# Patient Record
Sex: MALE | Race: WHITE | NOT HISPANIC OR LATINO | Employment: OTHER | URBAN - METROPOLITAN AREA
[De-identification: names, ages, dates, MRNs, and addresses within clinical notes are randomized per-mention and may not be internally consistent; named-entity substitution may affect disease eponyms.]

---

## 2020-03-17 ENCOUNTER — OFFICE VISIT (OUTPATIENT)
Dept: FAMILY MEDICINE | Facility: CLINIC | Age: 70
End: 2020-03-17
Payer: MEDICARE

## 2020-03-17 VITALS
SYSTOLIC BLOOD PRESSURE: 126 MMHG | WEIGHT: 214 LBS | OXYGEN SATURATION: 98 % | HEART RATE: 68 BPM | BODY MASS INDEX: 32.43 KG/M2 | TEMPERATURE: 98 F | HEIGHT: 68 IN | RESPIRATION RATE: 18 BRPM | DIASTOLIC BLOOD PRESSURE: 76 MMHG

## 2020-03-17 DIAGNOSIS — Z12.11 SCREENING FOR MALIGNANT NEOPLASM OF COLON: ICD-10-CM

## 2020-03-17 DIAGNOSIS — Z00.00 WELLNESS EXAMINATION: Primary | ICD-10-CM

## 2020-03-17 DIAGNOSIS — Z11.59 NEED FOR HEPATITIS C SCREENING TEST: ICD-10-CM

## 2020-03-17 DIAGNOSIS — Z79.899 ON LONG TERM DRUG THERAPY: ICD-10-CM

## 2020-03-17 DIAGNOSIS — Z12.5 SCREENING FOR MALIGNANT NEOPLASM OF PROSTATE: ICD-10-CM

## 2020-03-17 PROCEDURE — 99387 PR PREVENTIVE VISIT,NEW,65 & OVER: ICD-10-PCS | Mod: S$GLB,,, | Performed by: FAMILY MEDICINE

## 2020-03-17 PROCEDURE — 99387 INIT PM E/M NEW PAT 65+ YRS: CPT | Mod: S$GLB,,, | Performed by: FAMILY MEDICINE

## 2020-03-17 NOTE — PROGRESS NOTES
Subjective:      Patient ID: Torrey Lira is a 69 y.o. male.    Chief Complaint: Establish Care (has not had a PCP since he was 56/doesnt like taking medication/wife passed in august )      HPI:  69-year-old white male past history of knee osteoarthritis who presents for wellness exam.  Does not see a doctor regularly.  Was last seen went at age 56 for sinus congestion.  He does not use tobacco currently.  He quit smoking 30 years ago.  Drinks ethanol only on occasion and not in excess.  Complains of some left-sided knee pain.  Had a right-sided knee replacement in the feels great.  Has a follow-up with orthopedics coming up.  His wife  a few months ago.  He has some status around this.  He has a history of erectile dysfunction.  Did not do well with Viagra but did well with Cialis.    History reviewed. No pertinent past medical history.  Past Surgical History:   Procedure Laterality Date    LASIK Bilateral     TOTAL KNEE REPLACEMENT USING COMPUTER NAVIGATION Bilateral 2019, 2019     History reviewed. No pertinent family history.  Social History     Socioeconomic History    Marital status:      Spouse name: Not on file    Number of children: Not on file    Years of education: Not on file    Highest education level: Not on file   Occupational History    Not on file   Social Needs    Financial resource strain: Not on file    Food insecurity:     Worry: Not on file     Inability: Not on file    Transportation needs:     Medical: Not on file     Non-medical: Not on file   Tobacco Use    Smoking status: Former Smoker    Smokeless tobacco: Never Used   Substance and Sexual Activity    Alcohol use: Yes     Comment: only a shot when hes got a cold     Drug use: Never    Sexual activity: Not on file   Lifestyle    Physical activity:     Days per week: Not on file     Minutes per session: Not on file    Stress: Not on file   Relationships    Social connections:     Talks on phone: Not  "on file     Gets together: Not on file     Attends [employer] service: Not on file     Active member of club or organization: Not on file     Attends meetings of clubs or organizations: Not on file     Relationship status: Not on file   Other Topics Concern    Not on file   Social History Narrative    Not on file     Review of patient's allergies indicates:  No Known Allergies    Review of Systems   Constitutional: Negative for activity change, appetite change, chills, fatigue, fever and unexpected weight change.   HENT: Negative for congestion, ear pain, rhinorrhea, sinus pressure, sinus pain, sneezing, sore throat and trouble swallowing.    Eyes: Negative for photophobia, pain and itching.   Respiratory: Negative for cough, chest tightness, shortness of breath and wheezing.    Cardiovascular: Negative for chest pain, palpitations and leg swelling.   Gastrointestinal: Negative for abdominal distention, abdominal pain, constipation, diarrhea, nausea and vomiting.   Endocrine: Negative for cold intolerance, heat intolerance, polydipsia and polyphagia.   Genitourinary: Negative for difficulty urinating, dysuria and frequency.   Musculoskeletal: Positive for arthralgias and gait problem. Negative for joint swelling and myalgias.   Skin: Negative for pallor and rash.   Neurological: Negative for dizziness, seizures, syncope, speech difficulty and headaches.   Hematological: Negative for adenopathy. Does not bruise/bleed easily.   Psychiatric/Behavioral: Negative for agitation, behavioral problems and hallucinations.       Objective:       /76 (BP Location: Left arm, Patient Position: Sitting, BP Method: Large (Manual))   Pulse 68   Temp 98.3 °F (36.8 °C) (Oral)   Resp 18   Ht 5' 8" (1.727 m)   Wt 97.1 kg (214 lb)   SpO2 98%   BMI 32.54 kg/m²   Physical Exam   Constitutional: He is oriented to person, place, and time. He appears well-developed and well-nourished.   HENT:   Head: Normocephalic and " atraumatic.   Nose: Nose normal.   Mouth/Throat: Oropharynx is clear and moist.   Eyes: Pupils are equal, round, and reactive to light. Conjunctivae and EOM are normal.   Neck: Normal range of motion. Neck supple.   Cardiovascular: Normal rate, regular rhythm, normal heart sounds and intact distal pulses.   Pulmonary/Chest: Effort normal and breath sounds normal.   Abdominal: Soft. There is no tenderness.   Musculoskeletal: Normal range of motion.   Swelling noted to bilateral knees, surgical scars noted over bilateral knees.  Walking with slight limp on left.   Neurological: He is alert and oriented to person, place, and time.   Skin: Skin is warm and dry.   Psychiatric: He has a normal mood and affect. His behavior is normal. Judgment and thought content normal.   Nursing note and vitals reviewed.      Assessment:     1. Wellness examination    2. Screening for malignant neoplasm of colon    3. Screening for malignant neoplasm of prostate    4. On long term drug therapy    5. Need for hepatitis C screening test        Plan:   Wellness examination  -     CBC auto differential; Future; Expected date: 03/17/2020  -     TSH; Future; Expected date: 03/17/2020  -     T4, free; Future; Expected date: 03/17/2020  -     Comprehensive metabolic panel; Future; Expected date: 03/17/2020  -     Lipid panel; Future; Expected date: 03/17/2020  -     PSA, Screening; Future; Expected date: 03/17/2020  -     Fecal Immunochemical Test (iFOBT); Future; Expected date: 03/17/2020  -     Fecal Immunochemical Test (iFOBT); Future; Expected date: 03/17/2020    Screening for malignant neoplasm of colon  -     Fecal Immunochemical Test (iFOBT); Future; Expected date: 03/17/2020  -     Fecal Immunochemical Test (iFOBT); Future; Expected date: 03/17/2020    Screening for malignant neoplasm of prostate  -     PSA, Screening; Future; Expected date: 03/17/2020    On long term drug therapy  -     CBC auto differential; Future; Expected date:  03/17/2020  -     TSH; Future; Expected date: 03/17/2020  -     T4, free; Future; Expected date: 03/17/2020  -     Comprehensive metabolic panel; Future; Expected date: 03/17/2020  -     Lipid panel; Future; Expected date: 03/17/2020    Need for hepatitis C screening test  -     Hepatitis Panel, Acute; Future; Expected date: 03/17/2020      Reports he received flu and pneumonia vaccine at the pharmacy this year.    Consider medication based on lab findings.    Declined colonoscopy.  He is interested fit testing.         Disclaimer: This note may have been prepared using voice recognition software, it may have not been extensively proofed, as such there could be errors within the text such as sound alike errors.

## 2020-03-18 LAB
ABS NRBC COUNT: 0 X 10 3/UL (ref 0–0.01)
ABSOLUTE BASOPHIL: 0.03 X 10 3/UL (ref 0–0.22)
ABSOLUTE EOSINOPHIL: 0.1 X 10 3/UL (ref 0.04–0.54)
ABSOLUTE IMMATURE GRAN: 0.02 X 10 3/UL (ref 0–0.04)
ABSOLUTE LYMPHOCYTE: 3.06 X 10 3/UL (ref 0.86–4.75)
ABSOLUTE MONOCYTE: 0.64 X 10 3/UL (ref 0.22–1.08)
ALBUMIN SERPL-MCNC: 4.5 G/DL (ref 3.5–5.2)
ALBUMIN/GLOB SERPL ELPH: 1.5 {RATIO} (ref 1–2.7)
ALP ISOS SERPL LEV INH-CCNC: 70 U/L (ref 40–130)
ALT (SGPT): 39 U/L (ref 0–41)
ANION GAP SERPL CALC-SCNC: 14 MMOL/L (ref 8–17)
AST SERPL-CCNC: 35 U/L (ref 0–40)
BASOPHILS NFR BLD: 0.4 % (ref 0.2–1.2)
BILIRUBIN, TOTAL: 0.45 MG/DL (ref 0–1.2)
BUN/CREAT SERPL: 14.1 (ref 6–20)
CALCIUM SERPL-MCNC: 9.9 MG/DL (ref 8.6–10.2)
CARBON DIOXIDE, CO2: 23 MMOL/L (ref 22–29)
CHLORIDE: 104 MMOL/L (ref 98–107)
CHOLEST SERPL-MSCNC: 205 MG/DL (ref 100–200)
CREAT SERPL-MCNC: 0.8 MG/DL (ref 0.7–1.2)
EOSINOPHIL NFR BLD: 1.4 % (ref 0.7–7)
GFR ESTIMATION: 96.13
GLOBULIN: 3 G/DL (ref 1.5–4.5)
GLUCOSE: 89 MG/DL (ref 82–115)
HBV CORE IGM SERPL QL IA: NONREACTIVE
HBV SURFACE AG SERPL QL IA: NONREACTIVE
HCT VFR BLD AUTO: 48.8 % (ref 42–52)
HCV IGG SERPL QL IA: NONREACTIVE
HDLC SERPL-MCNC: 36 MG/DL
HEPATITIS A IGM: NONREACTIVE
HGB BLD-MCNC: 16.1 G/DL (ref 14–18)
IMMATURE GRANULOCYTES: 0.3 % (ref 0–0.5)
LDL/HDL RATIO: 3.5 (ref 1–3)
LDLC SERPL CALC-MCNC: 125.4 MG/DL (ref 0–100)
LYMPHOCYTES NFR BLD: 41.6 % (ref 19.3–53.1)
MCH RBC QN AUTO: 28.9 PG (ref 27–32)
MCHC RBC AUTO-ENTMCNC: 33 G/DL (ref 32–36)
MCV RBC AUTO: 87.5 FL (ref 80–94)
MONOCYTES NFR BLD: 8.7 % (ref 4.7–12.5)
NEUTROPHILS ABSOLUTE COUNT: 3.51 X 10 3/UL (ref 2.15–7.56)
NEUTROPHILS NFR BLD: 47.6 % (ref 34–71.1)
NUCLEATED RED BLOOD CELLS: 0 /100 WBC (ref 0–0.2)
PLATELET # BLD AUTO: 237 X 10 3/UL (ref 135–400)
POTASSIUM: 4.8 MMOL/L (ref 3.5–5.1)
PROT SNV-MCNC: 7.5 G/DL (ref 6.4–8.3)
PSA, DIAGNOSTIC: 0.53 NG/ML (ref 0–4)
RBC # BLD AUTO: 5.58 X 10 6/UL (ref 4.7–6.1)
RDW-SD: 39.7 FL (ref 37–54)
SODIUM: 141 MMOL/L (ref 136–145)
T4, FREE: 0.91 NG/DL (ref 0.93–1.7)
TRIGL SERPL-MCNC: 218 MG/DL (ref 0–150)
TSH SERPL DL<=0.005 MIU/L-ACNC: 3.14 UIU/ML (ref 0.27–4.2)
UREA NITROGEN (BUN): 11.3 MG/DL (ref 8–23)
WBC # BLD: 7.36 X 10 3/UL (ref 4.3–10.8)

## 2020-06-02 ENCOUNTER — TELEPHONE (OUTPATIENT)
Dept: FAMILY MEDICINE | Facility: CLINIC | Age: 70
End: 2020-06-02

## 2020-06-02 DIAGNOSIS — N52.9 ERECTILE DYSFUNCTION, UNSPECIFIED ERECTILE DYSFUNCTION TYPE: Primary | ICD-10-CM

## 2020-06-02 RX ORDER — TADALAFIL 10 MG/1
10 TABLET ORAL DAILY PRN
Qty: 6 TABLET | Refills: 1 | Status: SHIPPED | OUTPATIENT
Start: 2020-06-02 | End: 2020-09-08 | Stop reason: SDUPTHER

## 2020-06-02 NOTE — TELEPHONE ENCOUNTER
----- Message from Nicolette Yarbrough PA-C sent at 6/2/2020  9:06 AM CDT -----  Contact: patient  Prescription sent for cialis. 6 tablets sent with 1 refill.  Tried to contact pt. No answer. Please inform pt to take 1 tablet as needed. Warn of side effects; hypotension and priapism. [facility] may have coupon to get it cheaper.   ----- Message -----  From: Linda Weiner LPN  Sent: 6/1/2020   5:12 PM CDT  To: Nicolette Yarbrough PA-C    Patient wants to know if he qualifies for Cialis   ----- Message -----  From: Kellie Mascorro  Sent: 6/1/2020   1:44 PM CDT  To: Albert Childress ([facility]) Staff    Patient would like to notify staff that his weight has dropped down to 198.6 after exercising to lower his cholesterol. Patient is also wanted to know if he qualifies for cialis. Please call back at [phone]    [facility] in [place] is the pharmacy patient would like for his order of Cialis to go to.

## 2020-06-22 PROBLEM — Z00.00 WELLNESS EXAMINATION: Status: RESOLVED | Noted: 2020-03-17 | Resolved: 2020-06-22

## 2020-07-24 ENCOUNTER — TELEPHONE (OUTPATIENT)
Dept: FAMILY MEDICINE | Facility: CLINIC | Age: 70
End: 2020-07-24

## 2020-07-24 NOTE — TELEPHONE ENCOUNTER
Medication called in to patient's pharmacy. Patient is aware that medication was being called in.

## 2020-07-24 NOTE — TELEPHONE ENCOUNTER
----- Message from Kellie Mascorro sent at 7/22/2020  4:25 PM CDT -----  Regarding: patient refill  Contact: patient  Type:  RX Refill Request    Who Called: patient  Refill or New Rx:refill  RX Name and Strength: tadalafiL (CIALIS) 10 MG tablet  How is the patient currently taking it? (ex. 1XDay): pt stated he takes one and it would last him like 10 days  Is this a 30 day or 90 day Rx:6 tablets  Preferred Pharmacy with phone number:  [id number] - [facility], [place] - [address]  [address] [address]  Phone: [phone] Fax: [phone]  Local or Mail Order:local  Ordering Provider:Mornoe Price  Would the patient rather a call back or a response via [facility]? Call back  Best Call Back Number:[phone]  Additional Information: n/a

## 2020-09-08 DIAGNOSIS — N52.9 ERECTILE DYSFUNCTION, UNSPECIFIED ERECTILE DYSFUNCTION TYPE: ICD-10-CM

## 2020-09-08 RX ORDER — TADALAFIL 10 MG/1
10 TABLET ORAL DAILY PRN
Qty: 30 TABLET | Refills: 1 | Status: SHIPPED | OUTPATIENT
Start: 2020-09-08 | End: 2021-03-17 | Stop reason: SDUPTHER

## 2021-03-03 ENCOUNTER — IMMUNIZATION (OUTPATIENT)
Dept: HEMATOLOGY/ONCOLOGY | Facility: CLINIC | Age: 71
End: 2021-03-03
Payer: MEDICARE

## 2021-03-03 DIAGNOSIS — Z23 NEED FOR VACCINATION: Primary | ICD-10-CM

## 2021-03-03 PROCEDURE — 91301 COVID-19, MRNA, LNP-S, PF, 100 MCG/0.5 ML DOSE VACCINE: CPT | Mod: S$GLB,,, | Performed by: FAMILY MEDICINE

## 2021-03-03 PROCEDURE — 0011A COVID-19, MRNA, LNP-S, PF, 100 MCG/0.5 ML DOSE VACCINE: CPT | Mod: CV19,S$GLB,, | Performed by: FAMILY MEDICINE

## 2021-03-03 PROCEDURE — 91301 COVID-19, MRNA, LNP-S, PF, 100 MCG/0.5 ML DOSE VACCINE: ICD-10-PCS | Mod: S$GLB,,, | Performed by: FAMILY MEDICINE

## 2021-03-03 PROCEDURE — 0011A COVID-19, MRNA, LNP-S, PF, 100 MCG/0.5 ML DOSE VACCINE: ICD-10-PCS | Mod: CV19,S$GLB,, | Performed by: FAMILY MEDICINE

## 2021-03-17 ENCOUNTER — OFFICE VISIT (OUTPATIENT)
Dept: FAMILY MEDICINE | Facility: CLINIC | Age: 71
End: 2021-03-17
Payer: MEDICARE

## 2021-03-17 VITALS
RESPIRATION RATE: 18 BRPM | WEIGHT: 197 LBS | DIASTOLIC BLOOD PRESSURE: 82 MMHG | HEART RATE: 82 BPM | BODY MASS INDEX: 29.86 KG/M2 | HEIGHT: 68 IN | TEMPERATURE: 98 F | SYSTOLIC BLOOD PRESSURE: 136 MMHG | OXYGEN SATURATION: 98 %

## 2021-03-17 DIAGNOSIS — N52.9 ERECTILE DYSFUNCTION, UNSPECIFIED ERECTILE DYSFUNCTION TYPE: ICD-10-CM

## 2021-03-17 DIAGNOSIS — Z12.5 SCREENING FOR MALIGNANT NEOPLASM OF PROSTATE: ICD-10-CM

## 2021-03-17 DIAGNOSIS — Z79.899 ON LONG TERM DRUG THERAPY: ICD-10-CM

## 2021-03-17 DIAGNOSIS — R21 RASH: ICD-10-CM

## 2021-03-17 DIAGNOSIS — Z00.00 WELLNESS EXAMINATION: Primary | ICD-10-CM

## 2021-03-17 PROCEDURE — 99214 OFFICE O/P EST MOD 30 MIN: CPT | Mod: S$GLB,,, | Performed by: FAMILY MEDICINE

## 2021-03-17 PROCEDURE — 99214 PR OFFICE/OUTPT VISIT, EST, LEVL IV, 30-39 MIN: ICD-10-PCS | Mod: S$GLB,,, | Performed by: FAMILY MEDICINE

## 2021-03-17 RX ORDER — TADALAFIL 10 MG/1
10 TABLET ORAL DAILY PRN
Qty: 30 TABLET | Refills: 3 | Status: SHIPPED | OUTPATIENT
Start: 2021-03-17 | End: 2022-06-16 | Stop reason: SDUPTHER

## 2021-03-17 RX ORDER — TRIAMCINOLONE ACETONIDE 1 MG/G
CREAM TOPICAL 2 TIMES DAILY
Qty: 80 G | Refills: 1 | Status: SHIPPED | OUTPATIENT
Start: 2021-03-17 | End: 2021-09-28

## 2021-03-18 ENCOUNTER — CLINICAL SUPPORT (OUTPATIENT)
Dept: FAMILY MEDICINE | Facility: CLINIC | Age: 71
End: 2021-03-18
Payer: MEDICARE

## 2021-03-18 DIAGNOSIS — Z00.00 WELLNESS EXAMINATION: ICD-10-CM

## 2021-03-18 DIAGNOSIS — Z79.899 ON LONG TERM DRUG THERAPY: ICD-10-CM

## 2021-03-19 DIAGNOSIS — Z12.11 SCREENING FOR MALIGNANT NEOPLASM OF COLON: Primary | ICD-10-CM

## 2021-03-19 LAB
ABS NRBC COUNT: 0 X 10 3/UL (ref 0–0.01)
ABSOLUTE BASOPHIL: 0.04 X 10 3/UL (ref 0–0.22)
ABSOLUTE EOSINOPHIL: 0.09 X 10 3/UL (ref 0.04–0.54)
ABSOLUTE IMMATURE GRAN: 0.02 X 10 3/UL (ref 0–0.04)
ABSOLUTE LYMPHOCYTE: 2.23 X 10 3/UL (ref 0.86–4.75)
ABSOLUTE MONOCYTE: 0.61 X 10 3/UL (ref 0.22–1.08)
ALBUMIN SERPL-MCNC: 4.5 G/DL (ref 3.5–5.2)
ALBUMIN/GLOB SERPL ELPH: 1.6 {RATIO} (ref 1–2.7)
ALP ISOS SERPL LEV INH-CCNC: 60 U/L (ref 40–130)
ALT (SGPT): 17 U/L (ref 0–41)
ANION GAP SERPL CALC-SCNC: 12 MMOL/L (ref 8–17)
AST SERPL-CCNC: 30 U/L (ref 0–40)
BASOPHILS NFR BLD: 0.5 % (ref 0.2–1.2)
BILIRUBIN, TOTAL: 0.64 MG/DL (ref 0–1.2)
BUN/CREAT SERPL: 13.7 (ref 6–20)
CALCIUM SERPL-MCNC: 9.6 MG/DL (ref 8.6–10.2)
CARBON DIOXIDE, CO2: 25 MMOL/L (ref 22–29)
CHLORIDE: 101 MMOL/L (ref 98–107)
CHOLEST SERPL-MSCNC: 211 MG/DL (ref 100–200)
CREAT SERPL-MCNC: 0.93 MG/DL (ref 0.7–1.2)
EOSINOPHIL NFR BLD: 1.2 % (ref 0.7–7)
GFR ESTIMATION: 80.56
GLOBULIN: 2.8 G/DL (ref 1.5–4.5)
GLUCOSE: 90 MG/DL (ref 82–115)
HCT VFR BLD AUTO: 51.5 % (ref 42–52)
HDLC SERPL-MCNC: 43 MG/DL
HGB BLD-MCNC: 16.3 G/DL (ref 14–18)
IMMATURE GRANULOCYTES: 0.3 % (ref 0–0.5)
LDL/HDL RATIO: 3.4 (ref 1–3)
LDLC SERPL CALC-MCNC: 145.8 MG/DL (ref 0–100)
LYMPHOCYTES NFR BLD: 28.8 % (ref 19.3–53.1)
MCH RBC QN AUTO: 28.3 PG (ref 27–32)
MCHC RBC AUTO-ENTMCNC: 31.7 G/DL (ref 32–36)
MCV RBC AUTO: 89.4 FL (ref 80–94)
MONOCYTES NFR BLD: 7.9 % (ref 4.7–12.5)
NEUTROPHILS # BLD AUTO: 4.75 X 10 3/UL (ref 2.15–7.56)
NEUTROPHILS NFR BLD: 61.3 % (ref 34–71.1)
NUCLEATED RED BLOOD CELLS: 0 /100 WBC (ref 0–0.2)
PLATELET # BLD AUTO: 242 X 10 3/UL (ref 135–400)
POTASSIUM: 4.5 MMOL/L (ref 3.5–5.1)
PROT SNV-MCNC: 7.3 G/DL (ref 6.4–8.3)
RBC # BLD AUTO: 5.76 X 10 6/UL (ref 4.7–6.1)
RDW-SD: 43.8 FL (ref 37–54)
SODIUM: 138 MMOL/L (ref 136–145)
T4, FREE: 1.01 NG/DL (ref 0.93–1.7)
TRIGL SERPL-MCNC: 111 MG/DL (ref 0–150)
TSH SERPL DL<=0.005 MIU/L-ACNC: 4.43 UIU/ML (ref 0.27–4.2)
UREA NITROGEN (BUN): 12.7 MG/DL (ref 8–23)
WBC # BLD: 7.74 X 10 3/UL (ref 4.3–10.8)

## 2021-03-31 ENCOUNTER — IMMUNIZATION (OUTPATIENT)
Dept: HEMATOLOGY/ONCOLOGY | Facility: CLINIC | Age: 71
End: 2021-03-31
Payer: MEDICARE

## 2021-03-31 DIAGNOSIS — Z23 NEED FOR VACCINATION: Primary | ICD-10-CM

## 2021-03-31 PROCEDURE — 0012A COVID-19, MRNA, LNP-S, PF, 100 MCG/0.5 ML DOSE VACCINE: ICD-10-PCS | Mod: CV19,S$GLB,, | Performed by: FAMILY MEDICINE

## 2021-03-31 PROCEDURE — 91301 COVID-19, MRNA, LNP-S, PF, 100 MCG/0.5 ML DOSE VACCINE: ICD-10-PCS | Mod: S$GLB,,, | Performed by: FAMILY MEDICINE

## 2021-03-31 PROCEDURE — 91301 COVID-19, MRNA, LNP-S, PF, 100 MCG/0.5 ML DOSE VACCINE: CPT | Mod: S$GLB,,, | Performed by: FAMILY MEDICINE

## 2021-03-31 PROCEDURE — 0012A COVID-19, MRNA, LNP-S, PF, 100 MCG/0.5 ML DOSE VACCINE: CPT | Mod: CV19,S$GLB,, | Performed by: FAMILY MEDICINE

## 2021-04-01 LAB — NONINV COLON CA DNA+OCC BLD SCRN STL QL: NEGATIVE

## 2021-07-26 ENCOUNTER — OFFICE VISIT (OUTPATIENT)
Dept: FAMILY MEDICINE | Facility: CLINIC | Age: 71
End: 2021-07-26
Payer: MEDICARE

## 2021-07-26 DIAGNOSIS — H60.502 ACUTE OTITIS EXTERNA OF LEFT EAR, UNSPECIFIED TYPE: Primary | ICD-10-CM

## 2021-07-26 PROCEDURE — 99441 PR PHYSICIAN TELEPHONE EVALUATION 5-10 MIN: CPT | Mod: 95,,, | Performed by: STUDENT IN AN ORGANIZED HEALTH CARE EDUCATION/TRAINING PROGRAM

## 2021-07-26 PROCEDURE — 99441 PR PHYSICIAN TELEPHONE EVALUATION 5-10 MIN: ICD-10-PCS | Mod: 95,,, | Performed by: STUDENT IN AN ORGANIZED HEALTH CARE EDUCATION/TRAINING PROGRAM

## 2021-07-26 RX ORDER — OFLOXACIN 3 MG/ML
10 SOLUTION AURICULAR (OTIC) DAILY
Qty: 5 ML | Refills: 0 | Status: SHIPPED | OUTPATIENT
Start: 2021-07-26 | End: 2021-09-28

## 2021-09-28 ENCOUNTER — OFFICE VISIT (OUTPATIENT)
Dept: FAMILY MEDICINE | Facility: CLINIC | Age: 71
End: 2021-09-28
Payer: MEDICARE

## 2021-09-28 VITALS
BODY MASS INDEX: 29.4 KG/M2 | WEIGHT: 194 LBS | SYSTOLIC BLOOD PRESSURE: 130 MMHG | HEIGHT: 68 IN | DIASTOLIC BLOOD PRESSURE: 72 MMHG | TEMPERATURE: 98 F

## 2021-09-28 DIAGNOSIS — M54.9 DORSALGIA, UNSPECIFIED: ICD-10-CM

## 2021-09-28 DIAGNOSIS — M54.17 LUMBOSACRAL RADICULOPATHY: Primary | ICD-10-CM

## 2021-09-28 DIAGNOSIS — R20.0 SADDLE ANESTHESIA: ICD-10-CM

## 2021-09-28 LAB
BILIRUB SERPL-MCNC: NEGATIVE MG/DL
BLOOD, POC UA: NEGATIVE
GLUCOSE UR QL STRIP: NEGATIVE
KETONES UR QL STRIP: NEGATIVE
LEUKOCYTE ESTERASE URINE, POC: NORMAL
NITRITE, POC UA: NEGATIVE
PH, POC UA: 5.5
PROTEIN, POC: NEGATIVE
SPECIFIC GRAVITY, POC UA: >=1.03
UROBILINOGEN, POC UA: 0.2

## 2021-09-28 PROCEDURE — 99214 OFFICE O/P EST MOD 30 MIN: CPT | Mod: S$GLB,,, | Performed by: STUDENT IN AN ORGANIZED HEALTH CARE EDUCATION/TRAINING PROGRAM

## 2021-09-28 PROCEDURE — 81003 POCT URINALYSIS: ICD-10-PCS | Mod: QW,S$GLB,, | Performed by: STUDENT IN AN ORGANIZED HEALTH CARE EDUCATION/TRAINING PROGRAM

## 2021-09-28 PROCEDURE — 81003 URINALYSIS AUTO W/O SCOPE: CPT | Mod: QW,S$GLB,, | Performed by: STUDENT IN AN ORGANIZED HEALTH CARE EDUCATION/TRAINING PROGRAM

## 2021-09-28 PROCEDURE — 99214 PR OFFICE/OUTPT VISIT, EST, LEVL IV, 30-39 MIN: ICD-10-PCS | Mod: S$GLB,,, | Performed by: STUDENT IN AN ORGANIZED HEALTH CARE EDUCATION/TRAINING PROGRAM

## 2021-09-28 RX ORDER — PHENAZOPYRIDINE HYDROCHLORIDE 100 MG/1
TABLET, FILM COATED ORAL
COMMUNITY
Start: 2021-09-04 | End: 2021-09-28

## 2021-09-28 RX ORDER — GABAPENTIN 300 MG/1
300 CAPSULE ORAL NIGHTLY
Qty: 30 CAPSULE | Refills: 2 | Status: SHIPPED | OUTPATIENT
Start: 2021-09-28 | End: 2021-10-27 | Stop reason: SDUPTHER

## 2021-10-27 DIAGNOSIS — M54.17 LUMBOSACRAL RADICULOPATHY: ICD-10-CM

## 2021-10-27 RX ORDER — GABAPENTIN 300 MG/1
300 CAPSULE ORAL 2 TIMES DAILY
Qty: 60 CAPSULE | Refills: 2 | Status: SHIPPED | OUTPATIENT
Start: 2021-10-27 | End: 2021-10-27 | Stop reason: SDUPTHER

## 2021-10-27 RX ORDER — GABAPENTIN 300 MG/1
300 CAPSULE ORAL 2 TIMES DAILY
Qty: 60 CAPSULE | Refills: 2 | Status: SHIPPED | OUTPATIENT
Start: 2021-10-27 | End: 2022-06-14

## 2022-01-04 ENCOUNTER — OFFICE VISIT (OUTPATIENT)
Dept: FAMILY MEDICINE | Facility: CLINIC | Age: 72
End: 2022-01-04
Payer: MEDICARE

## 2022-01-04 VITALS
SYSTOLIC BLOOD PRESSURE: 136 MMHG | WEIGHT: 202.38 LBS | HEIGHT: 68 IN | BODY MASS INDEX: 30.67 KG/M2 | DIASTOLIC BLOOD PRESSURE: 80 MMHG | HEART RATE: 55 BPM | OXYGEN SATURATION: 99 % | TEMPERATURE: 98 F

## 2022-01-04 DIAGNOSIS — M54.17 LUMBOSACRAL RADICULOPATHY: Primary | ICD-10-CM

## 2022-01-04 PROCEDURE — 99213 PR OFFICE/OUTPT VISIT, EST, LEVL III, 20-29 MIN: ICD-10-PCS | Mod: S$GLB,,, | Performed by: STUDENT IN AN ORGANIZED HEALTH CARE EDUCATION/TRAINING PROGRAM

## 2022-01-04 PROCEDURE — 99213 OFFICE O/P EST LOW 20 MIN: CPT | Mod: S$GLB,,, | Performed by: STUDENT IN AN ORGANIZED HEALTH CARE EDUCATION/TRAINING PROGRAM

## 2022-01-04 NOTE — PROGRESS NOTES
Subjective:      Patient ID: Torrey Lira is a 71 y.o. male.    Chief Complaint: Other (Leg burning/no pain)      HPI:  71-year-old male with past medical history of lumbar radiculopathy presents today for follow-up of lumbar radiculopathy.  Patient recently had MRI done.  Was not interested in further management at that time.  States he has been working out of town recently.  His symptoms are seeming to worsen.  Reports numbness and tingling in his left leg, down to the bottom of his foot.  He has tried the gabapentin with no improvement.  He does not wish to try increasing the dosage at this time.  He does not like to take medication daily.  He is currently in town in between jobs, and would like to be referred for injections.    No past medical history on file.  Past Surgical History:   Procedure Laterality Date    LASIK Bilateral 2013    TOTAL KNEE REPLACEMENT USING COMPUTER NAVIGATION Bilateral 08/2019, 12/2019     No family history on file.  Social History     Socioeconomic History    Marital status:    Tobacco Use    Smoking status: Former Smoker    Smokeless tobacco: Never Used   Substance and Sexual Activity    Alcohol use: Yes     Comment: only a shot when hes got a cold     Drug use: Never     Review of patient's allergies indicates:  No Known Allergies    Review of Systems   Constitutional: Negative for activity change, appetite change, fatigue, fever and unexpected weight change.   HENT: Negative for congestion, postnasal drip, rhinorrhea and sinus pain.    Eyes: Negative for visual disturbance.   Respiratory: Negative for cough and shortness of breath.    Cardiovascular: Negative for chest pain and palpitations.   Gastrointestinal: Negative for abdominal pain.   Musculoskeletal: Negative for arthralgias and myalgias.   Neurological: Negative for dizziness and light-headedness.   Psychiatric/Behavioral: Negative for behavioral problems and dysphoric mood. The patient is not  "nervous/anxious.        Objective:       /80 (BP Location: Left arm, Patient Position: Sitting, BP Method: Medium (Manual))   Pulse (!) 55   Temp 98.3 °F (36.8 °C) (Oral)   Ht 5' 8" (1.727 m)   Wt 91.8 kg (202 lb 6.4 oz)   SpO2 99%   BMI 30.77 kg/m²   Physical Exam  Vitals and nursing note reviewed.   Constitutional:       Appearance: Normal appearance. He is well-developed and well-nourished.   HENT:      Head: Normocephalic and atraumatic.   Eyes:      Extraocular Movements: Extraocular movements intact and EOM normal.      Conjunctiva/sclera: Conjunctivae normal.      Pupils: Pupils are equal, round, and reactive to light.   Cardiovascular:      Rate and Rhythm: Normal rate and regular rhythm.      Heart sounds: Normal heart sounds.   Pulmonary:      Effort: Pulmonary effort is normal.      Breath sounds: Normal breath sounds.   Abdominal:      General: Bowel sounds are normal.      Palpations: Abdomen is soft.   Musculoskeletal:         General: Normal range of motion.      Cervical back: Normal range of motion and neck supple.   Skin:     General: Skin is warm and dry.   Neurological:      General: No focal deficit present.      Mental Status: He is alert and oriented to person, place, and time.   Psychiatric:         Mood and Affect: Mood and affect and mood normal.         Thought Content: Thought content normal.         Assessment:     1. Lumbosacral radiculopathy        Plan:   Lumbosacral radiculopathy  -     Ambulatory referral/consult to Pain Clinic; Future; Expected date: 01/11/2022      Reviewed patient's MRI results.    He has tried gabapentin with no improvement.    Referral pending.    Strict precautions provided.  Patient stated understanding.  Medication List with Changes/Refills   Current Medications    GABAPENTIN (NEURONTIN) 300 MG CAPSULE    Take 1 capsule (300 mg total) by mouth 2 (two) times daily.    TADALAFIL (CIALIS) 10 MG TABLET    Take 1 tablet (10 mg total) by mouth daily as " needed for Erectile Dysfunction.              Disclaimer: This note may have been prepared using voice recognition software, it may have not been extensively proofed, as such there could be errors within the text such as sound alike errors.

## 2022-01-07 ENCOUNTER — TELEPHONE (OUTPATIENT)
Dept: FAMILY MEDICINE | Facility: CLINIC | Age: 72
End: 2022-01-07
Payer: MEDICARE

## 2022-01-07 NOTE — TELEPHONE ENCOUNTER
Call returned to patient and let him know that referral was sent to Dr. Ragsdale    ----- Message from Saskia Loo MA sent at 1/7/2022 10:51 AM CST -----  Contact: self    ----- Message -----  From: Gianna Unger  Sent: 1/7/2022   8:36 AM CST  To: Albert Childress ([facility]) Staff    Requesting a call back regarding the status of referral for him to see a spine specialist. Please call back at [phone]

## 2022-01-11 ENCOUNTER — PATIENT OUTREACH (OUTPATIENT)
Dept: ADMINISTRATIVE | Facility: HOSPITAL | Age: 72
End: 2022-01-11
Payer: MEDICARE

## 2022-06-14 ENCOUNTER — OFFICE VISIT (OUTPATIENT)
Dept: FAMILY MEDICINE | Facility: CLINIC | Age: 72
End: 2022-06-14
Payer: MEDICARE

## 2022-06-14 VITALS
TEMPERATURE: 98 F | SYSTOLIC BLOOD PRESSURE: 132 MMHG | DIASTOLIC BLOOD PRESSURE: 84 MMHG | OXYGEN SATURATION: 96 % | RESPIRATION RATE: 20 BRPM | WEIGHT: 209 LBS | HEIGHT: 68 IN | HEART RATE: 60 BPM | BODY MASS INDEX: 31.67 KG/M2

## 2022-06-14 DIAGNOSIS — Z00.00 WELLNESS EXAMINATION: Primary | ICD-10-CM

## 2022-06-14 DIAGNOSIS — Z12.5 ENCOUNTER FOR SCREENING FOR MALIGNANT NEOPLASM OF PROSTATE: ICD-10-CM

## 2022-06-14 DIAGNOSIS — Z79.899 ON LONG TERM DRUG THERAPY: ICD-10-CM

## 2022-06-14 PROCEDURE — 99397 PER PM REEVAL EST PAT 65+ YR: CPT | Mod: GY,S$GLB,, | Performed by: FAMILY MEDICINE

## 2022-06-14 PROCEDURE — 99397 PR PREVENTIVE VISIT,EST,65 & OVER: ICD-10-PCS | Mod: GY,S$GLB,, | Performed by: FAMILY MEDICINE

## 2022-06-14 NOTE — PROGRESS NOTES
Subjective:      Patient ID: Torrey Lira is a 71 y.o. male.    Chief Complaint: Hemorrhoids and Annual Exam (Pt. C/o pain, burning to rectum.)      HPI:  71-year-old male who presents for wellness exam.  Complains of some pain in his left leg.  Recently saw the chiropractor.  Does seem better.  They were considering surgery on his back but he wishes to withhold this at this time.  Did not like gabapentin.  He is currently working.  Working out a [place].  No other acute complaints    No past medical history on file.  Past Surgical History:   Procedure Laterality Date    LASIK Bilateral 2013    TOTAL KNEE REPLACEMENT USING COMPUTER NAVIGATION Bilateral 08/2019, 12/2019     No family history on file.  Social History     Socioeconomic History    Marital status:    Tobacco Use    Smoking status: Former Smoker    Smokeless tobacco: Never Used   Substance and Sexual Activity    Alcohol use: Yes     Comment: only a shot when hes got a cold     Drug use: Never     Review of patient's allergies indicates:  No Known Allergies    Review of Systems   Constitutional: Negative for activity change, appetite change, chills, fatigue, fever and unexpected weight change.   HENT: Negative for congestion, ear pain, rhinorrhea, sinus pressure, sinus pain, sneezing, sore throat and trouble swallowing.    Eyes: Negative for photophobia, pain and itching.   Respiratory: Negative for cough, chest tightness, shortness of breath and wheezing.    Cardiovascular: Negative for chest pain, palpitations and leg swelling.   Gastrointestinal: Negative for abdominal distention, abdominal pain, constipation, diarrhea, nausea and vomiting.   Endocrine: Negative for cold intolerance, heat intolerance, polydipsia and polyphagia.   Genitourinary: Negative for difficulty urinating, dysuria and frequency.   Musculoskeletal: Negative for arthralgias, joint swelling and myalgias.   Skin: Negative for pallor and rash.   Neurological: Positive  "for numbness. Negative for dizziness, seizures, syncope, speech difficulty and headaches.   Hematological: Negative for adenopathy. Does not bruise/bleed easily.   Psychiatric/Behavioral: Negative for agitation, behavioral problems and hallucinations.       Objective:       /84   Pulse 60   Temp 97.9 °F (36.6 °C)   Resp 20   Ht 5' 8" (1.727 m)   Wt 94.8 kg (209 lb)   SpO2 96%   BMI 31.78 kg/m²   Physical Exam  Vitals and nursing note reviewed.   Constitutional:       Appearance: He is well-developed.   HENT:      Head: Normocephalic and atraumatic.      Nose: Nose normal.   Eyes:      Conjunctiva/sclera: Conjunctivae normal.      Pupils: Pupils are equal, round, and reactive to light.   Cardiovascular:      Rate and Rhythm: Normal rate and regular rhythm.      Heart sounds: Normal heart sounds.   Pulmonary:      Effort: Pulmonary effort is normal.      Breath sounds: Normal breath sounds.   Abdominal:      Palpations: Abdomen is soft.      Tenderness: There is no abdominal tenderness.   Musculoskeletal:         General: Normal range of motion.      Cervical back: Normal range of motion and neck supple.   Skin:     General: Skin is warm and dry.   Neurological:      Mental Status: He is alert and oriented to person, place, and time.   Psychiatric:         Behavior: Behavior normal.         Thought Content: Thought content normal.         Judgment: Judgment normal.         Assessment:     1. Wellness examination    2. On long term drug therapy    3. Encounter for screening for malignant neoplasm of prostate         Plan:   Wellness examination  -     Hemoglobin A1C; Future; Expected date: 06/14/2022  -     CBC Auto Differential; Future; Expected date: 06/14/2022  -     TSH; Future; Expected date: 06/14/2022  -     T4, Free; Future; Expected date: 06/14/2022  -     Comprehensive Metabolic Panel; Future; Expected date: 06/14/2022  -     Lipid Panel; Future; Expected date: 06/14/2022  -     PSA, Screening; " Future; Expected date: 06/14/2022    On long term drug therapy  -     Hemoglobin A1C; Future; Expected date: 06/14/2022  -     CBC Auto Differential; Future; Expected date: 06/14/2022  -     TSH; Future; Expected date: 06/14/2022  -     T4, Free; Future; Expected date: 06/14/2022  -     Comprehensive Metabolic Panel; Future; Expected date: 06/14/2022  -     Lipid Panel; Future; Expected date: 06/14/2022  -     PSA, Screening; Future; Expected date: 06/14/2022    Encounter for screening for malignant neoplasm of prostate   -     PSA, Screening; Future; Expected date: 06/14/2022      Labs pending.    Follow-up in 1 year.  Sooner if needed.        Medication List with Changes/Refills   Current Medications    TADALAFIL (CIALIS) 10 MG TABLET    Take 1 tablet (10 mg total) by mouth daily as needed for Erectile Dysfunction.   Discontinued Medications    GABAPENTIN (NEURONTIN) 300 MG CAPSULE    Take 1 capsule (300 mg total) by mouth 2 (two) times daily.            Disclaimer: This note may have been prepared using voice recognition software, it may have not been extensively proofed, as such there could be errors within the text such as sound alike errors.

## 2022-06-15 LAB
ABS NRBC COUNT: 0 X 10 3/UL (ref 0–0.01)
ABSOLUTE BASOPHIL: 0.03 X 10 3/UL (ref 0–0.22)
ABSOLUTE EOSINOPHIL: 0.18 X 10 3/UL (ref 0.04–0.54)
ABSOLUTE IMMATURE GRAN: 0.01 X 10 3/UL (ref 0–0.04)
ABSOLUTE LYMPHOCYTE: 2.47 X 10 3/UL (ref 0.86–4.75)
ABSOLUTE MONOCYTE: 0.82 X 10 3/UL (ref 0.22–1.08)
ALBUMIN SERPL-MCNC: 4.3 G/DL (ref 3.5–5.2)
ALBUMIN/GLOB SERPL ELPH: 1.5 {RATIO} (ref 1–2.7)
ALP ISOS SERPL LEV INH-CCNC: 57 U/L (ref 40–130)
ALT (SGPT): 18 U/L (ref 0–41)
ANION GAP SERPL CALC-SCNC: 12 MMOL/L (ref 8–17)
AST SERPL-CCNC: 19 U/L (ref 0–40)
BASOPHILS NFR BLD: 0.4 % (ref 0.2–1.2)
BILIRUBIN, TOTAL: 0.33 MG/DL (ref 0–1.2)
BUN/CREAT SERPL: 13.6 (ref 6–20)
CALCIUM SERPL-MCNC: 9.4 MG/DL (ref 8.6–10.2)
CARBON DIOXIDE, CO2: 23 MMOL/L (ref 22–29)
CHLORIDE: 103 MMOL/L (ref 98–107)
CHOLEST SERPL-MSCNC: 219 MG/DL (ref 100–200)
CREAT SERPL-MCNC: 0.81 MG/DL (ref 0.7–1.2)
EOSINOPHIL NFR BLD: 2.4 % (ref 0.7–7)
ESTIMATED AVERAGE GLUCOSE: 101 MG/DL
GFR ESTIMATION: 94.21
GLOBULIN: 2.9 G/DL (ref 1.5–4.5)
GLUCOSE: 86 MG/DL (ref 82–115)
HBA1C MFR BLD: 5.1 % (ref 4–6)
HCT VFR BLD AUTO: 49.3 % (ref 42–52)
HDLC SERPL-MCNC: 43 MG/DL
HGB BLD-MCNC: 16.4 G/DL (ref 14–18)
IMMATURE GRANULOCYTES: 0.1 % (ref 0–0.5)
LDL/HDL RATIO: 3 (ref 1–3)
LDLC SERPL CALC-MCNC: 130.8 MG/DL (ref 0–100)
LYMPHOCYTES NFR BLD: 32.5 % (ref 19.3–53.1)
MCH RBC QN AUTO: 28.9 PG (ref 27–32)
MCHC RBC AUTO-ENTMCNC: 33.3 G/DL (ref 32–36)
MCV RBC AUTO: 86.9 FL (ref 80–94)
MONOCYTES NFR BLD: 10.8 % (ref 4.7–12.5)
NEUTROPHILS # BLD AUTO: 4.1 X 10 3/UL (ref 2.15–7.56)
NEUTROPHILS NFR BLD: 53.8 % (ref 34–71.1)
NUCLEATED RED BLOOD CELLS: 0 /100 WBC (ref 0–0.2)
PLATELET # BLD AUTO: 236 X 10 3/UL (ref 135–400)
POTASSIUM: 4.5 MMOL/L (ref 3.5–5.1)
PROT SNV-MCNC: 7.2 G/DL (ref 6.4–8.3)
PSA, DIAGNOSTIC: 0.41 NG/ML (ref 0–4)
RBC # BLD AUTO: 5.67 X 10 6/UL (ref 4.7–6.1)
RDW-SD: 42.7 FL (ref 37–54)
SODIUM: 138 MMOL/L (ref 136–145)
T4, FREE: 0.99 NG/DL (ref 0.93–1.7)
TRIGL SERPL-MCNC: 226 MG/DL (ref 0–150)
TSH SERPL DL<=0.005 MIU/L-ACNC: 5.56 UIU/ML (ref 0.27–4.2)
UREA NITROGEN (BUN): 11 MG/DL (ref 8–23)
WBC # BLD: 7.61 X 10 3/UL (ref 4.3–10.8)

## 2022-06-16 DIAGNOSIS — N52.9 ERECTILE DYSFUNCTION, UNSPECIFIED ERECTILE DYSFUNCTION TYPE: ICD-10-CM

## 2022-06-16 RX ORDER — TADALAFIL 10 MG/1
10 TABLET ORAL DAILY PRN
Qty: 30 TABLET | Refills: 3 | Status: SHIPPED | OUTPATIENT
Start: 2022-06-16 | End: 2023-06-08 | Stop reason: SDUPTHER

## 2023-02-13 ENCOUNTER — OFFICE VISIT (OUTPATIENT)
Dept: FAMILY MEDICINE | Facility: CLINIC | Age: 73
End: 2023-02-13
Payer: MEDICARE

## 2023-02-13 VITALS
HEIGHT: 68 IN | RESPIRATION RATE: 20 BRPM | WEIGHT: 211.81 LBS | BODY MASS INDEX: 32.1 KG/M2 | DIASTOLIC BLOOD PRESSURE: 84 MMHG | TEMPERATURE: 98 F | SYSTOLIC BLOOD PRESSURE: 128 MMHG | OXYGEN SATURATION: 97 % | HEART RATE: 68 BPM

## 2023-02-13 DIAGNOSIS — E03.8 SUBCLINICAL HYPOTHYROIDISM: ICD-10-CM

## 2023-02-13 DIAGNOSIS — Z23 IMMUNIZATION DUE: ICD-10-CM

## 2023-02-13 DIAGNOSIS — H72.92 PERFORATION OF LEFT TYMPANIC MEMBRANE: Primary | ICD-10-CM

## 2023-02-13 DIAGNOSIS — E78.5 HYPERLIPIDEMIA, UNSPECIFIED HYPERLIPIDEMIA TYPE: ICD-10-CM

## 2023-02-13 DIAGNOSIS — Z79.899 ON LONG TERM DRUG THERAPY: ICD-10-CM

## 2023-02-13 DIAGNOSIS — H91.90 HEARING LOSS, UNSPECIFIED HEARING LOSS TYPE, UNSPECIFIED LATERALITY: ICD-10-CM

## 2023-02-13 PROCEDURE — 99214 PR OFFICE/OUTPT VISIT, EST, LEVL IV, 30-39 MIN: ICD-10-PCS | Mod: S$GLB,,, | Performed by: FAMILY MEDICINE

## 2023-02-13 PROCEDURE — 99214 OFFICE O/P EST MOD 30 MIN: CPT | Mod: S$GLB,,, | Performed by: FAMILY MEDICINE

## 2023-02-13 NOTE — PROGRESS NOTES
Subjective:      Patient ID: Torrey Lira is a 72 y.o. male.    Chief Complaint: Follow-up      HPI:  72-year-old male who presents for hearing loss left ear.  Just recently noticed hearing loss when talking to people.  Still has some intermittent left leg pain.  Working in [place].  Interested in the flu shot.  Was back to [place] soon.    No past medical history on file.  Past Surgical History:   Procedure Laterality Date    LASIK Bilateral 2013    TOTAL KNEE REPLACEMENT USING COMPUTER NAVIGATION Bilateral 08/2019, 12/2019     No family history on file.  Social History     Socioeconomic History    Marital status:    Tobacco Use    Smoking status: Former    Smokeless tobacco: Never   Substance and Sexual Activity    Alcohol use: Yes     Comment: only a shot when hes got a cold     Drug use: Never     Review of patient's allergies indicates:  No Known Allergies    Review of Systems   Constitutional:  Negative for activity change, appetite change, chills, fatigue, fever and unexpected weight change.   HENT:  Positive for hearing loss. Negative for congestion, ear pain, rhinorrhea, sinus pressure, sinus pain, sneezing, sore throat and trouble swallowing.    Eyes:  Negative for photophobia, pain and itching.   Respiratory:  Negative for cough, chest tightness, shortness of breath and wheezing.    Cardiovascular:  Negative for chest pain, palpitations and leg swelling.   Gastrointestinal:  Negative for abdominal distention, abdominal pain, constipation, diarrhea, nausea and vomiting.   Endocrine: Negative for cold intolerance, heat intolerance, polydipsia and polyphagia.   Genitourinary:  Negative for difficulty urinating, dysuria and frequency.   Musculoskeletal:  Positive for back pain. Negative for arthralgias, joint swelling and myalgias.   Skin:  Negative for pallor and rash.   Neurological:  Positive for numbness. Negative for dizziness, seizures, syncope, speech difficulty and headaches.  "  Hematological:  Negative for adenopathy. Does not bruise/bleed easily.   Psychiatric/Behavioral:  Negative for agitation, behavioral problems and hallucinations.      Objective:       /84   Pulse 68   Temp 98 °F (36.7 °C) (Oral)   Resp 20   Ht 5' 8" (1.727 m)   Wt 96.1 kg (211 lb 12.8 oz)   SpO2 97%   BMI 32.20 kg/m²   Physical Exam  Vitals and nursing note reviewed.   Constitutional:       Appearance: He is well-developed.   HENT:      Head: Normocephalic and atraumatic.      Comments: Ruptured TM on left, no obvious discharge in canal.     Nose: Nose normal.   Eyes:      Conjunctiva/sclera: Conjunctivae normal.      Pupils: Pupils are equal, round, and reactive to light.   Cardiovascular:      Rate and Rhythm: Normal rate and regular rhythm.      Heart sounds: Normal heart sounds.   Pulmonary:      Effort: Pulmonary effort is normal.      Breath sounds: Normal breath sounds.   Abdominal:      Palpations: Abdomen is soft.      Tenderness: There is no abdominal tenderness.   Musculoskeletal:         General: Normal range of motion.      Cervical back: Normal range of motion and neck supple.   Skin:     General: Skin is warm and dry.   Neurological:      Mental Status: He is alert and oriented to person, place, and time.   Psychiatric:         Behavior: Behavior normal.         Thought Content: Thought content normal.         Judgment: Judgment normal.       Assessment:     1. Perforation of left tympanic membrane    2. Immunization due    3. On long term drug therapy    4. Hyperlipidemia, unspecified hyperlipidemia type    5. Subclinical hypothyroidism    6. Hearing loss, unspecified hearing loss type, unspecified laterality        Plan:   Perforation of left tympanic membrane  -     Ambulatory referral/consult to ENT; Future; Expected date: 02/20/2023    Immunization due    On long term drug therapy  -     Hemoglobin A1C; Future; Expected date: 02/13/2023  -     CBC Auto Differential; Future; " Expected date: 02/13/2023    Hyperlipidemia, unspecified hyperlipidemia type  -     Comprehensive Metabolic Panel; Future; Expected date: 02/13/2023  -     Lipid Panel; Future; Expected date: 02/13/2023    Subclinical hypothyroidism  -     TSH; Future; Expected date: 02/13/2023  -     T4, Free; Future; Expected date: 02/13/2023    Hearing loss, unspecified hearing loss type, unspecified laterality      Refer to ENT.      Will get lab work at later date.      Weight gain noted.  Plans to diet and exercise soon.      Follow-up when he is back in town.    Medication List with Changes/Refills   Current Medications    TADALAFIL (CIALIS) 10 MG TABLET    Take 1 tablet (10 mg total) by mouth daily as needed for Erectile Dysfunction.            Disclaimer: This note may have been prepared using voice recognition software, it may have not been extensively proofed, as such there could be errors within the text such as sound alike errors.

## 2023-02-14 PROCEDURE — 90694 FLU VACCINE - QUADRIVALENT - ADJUVANTED: ICD-10-PCS | Mod: S$GLB,,, | Performed by: FAMILY MEDICINE

## 2023-02-14 PROCEDURE — G0008 ADMIN INFLUENZA VIRUS VAC: HCPCS | Mod: S$GLB,,, | Performed by: FAMILY MEDICINE

## 2023-02-14 PROCEDURE — G0008 FLU VACCINE - QUADRIVALENT - ADJUVANTED: ICD-10-PCS | Mod: S$GLB,,, | Performed by: FAMILY MEDICINE

## 2023-02-14 PROCEDURE — 90694 VACC AIIV4 NO PRSRV 0.5ML IM: CPT | Mod: S$GLB,,, | Performed by: FAMILY MEDICINE

## 2023-02-16 ENCOUNTER — CLINICAL SUPPORT (OUTPATIENT)
Dept: FAMILY MEDICINE | Facility: CLINIC | Age: 73
End: 2023-02-16
Payer: MEDICARE

## 2023-02-16 DIAGNOSIS — E03.8 SUBCLINICAL HYPOTHYROIDISM: ICD-10-CM

## 2023-02-16 DIAGNOSIS — Z79.899 ON LONG TERM DRUG THERAPY: ICD-10-CM

## 2023-02-16 DIAGNOSIS — E78.5 HYPERLIPIDEMIA, UNSPECIFIED HYPERLIPIDEMIA TYPE: ICD-10-CM

## 2023-02-16 LAB
ABS NRBC COUNT: 0 X 10 3/UL (ref 0–0.01)
ABSOLUTE BASOPHIL: 0.02 X 10 3/UL (ref 0–0.22)
ABSOLUTE EOSINOPHIL: 0.16 X 10 3/UL (ref 0.04–0.54)
ABSOLUTE IMMATURE GRAN: 0.02 X 10 3/UL (ref 0–0.04)
ABSOLUTE LYMPHOCYTE: 2.31 X 10 3/UL (ref 0.86–4.75)
ABSOLUTE MONOCYTE: 0.56 X 10 3/UL (ref 0.22–1.08)
ALBUMIN SERPL-MCNC: 4.1 G/DL (ref 3.5–5.2)
ALBUMIN/GLOB SERPL ELPH: 1.4 {RATIO} (ref 1–2.7)
ALP ISOS SERPL LEV INH-CCNC: 59 U/L (ref 40–130)
ALT (SGPT): 17 U/L (ref 0–41)
ANION GAP SERPL CALC-SCNC: 12 MMOL/L (ref 8–17)
AST SERPL-CCNC: 20 U/L (ref 0–40)
BASOPHILS NFR BLD: 0.3 % (ref 0.2–1.2)
BILIRUBIN, TOTAL: 0.46 MG/DL (ref 0–1.2)
BUN/CREAT SERPL: 14.3 (ref 6–20)
CALCIUM SERPL-MCNC: 9 MG/DL (ref 8.6–10.2)
CARBON DIOXIDE, CO2: 21 MMOL/L (ref 22–29)
CHLORIDE: 105 MMOL/L (ref 98–107)
CHOLEST SERPL-MSCNC: 208 MG/DL (ref 100–200)
CREAT SERPL-MCNC: 0.81 MG/DL (ref 0.7–1.2)
EOSINOPHIL NFR BLD: 2.5 % (ref 0.7–7)
ESTIMATED AVERAGE GLUCOSE: 99 MG/DL
GFR ESTIMATION: 94.26
GLOBULIN: 2.9 G/DL (ref 1.5–4.5)
GLUCOSE: 92 MG/DL (ref 82–115)
HBA1C MFR BLD: 5.1 % (ref 4–6)
HCT VFR BLD AUTO: 47 % (ref 42–52)
HDLC SERPL-MCNC: 35 MG/DL
HGB BLD-MCNC: 15.9 G/DL (ref 14–18)
IMMATURE GRANULOCYTES: 0.3 % (ref 0–0.5)
LDL/HDL RATIO: 4 (ref 1–3)
LDLC SERPL CALC-MCNC: 140.4 MG/DL (ref 0–100)
LYMPHOCYTES NFR BLD: 36.8 % (ref 19.3–53.1)
MCH RBC QN AUTO: 28.6 PG (ref 27–32)
MCHC RBC AUTO-ENTMCNC: 33.8 G/DL (ref 32–36)
MCV RBC AUTO: 84.7 FL (ref 80–94)
MONOCYTES NFR BLD: 8.9 % (ref 4.7–12.5)
NEUTROPHILS # BLD AUTO: 3.21 X 10 3/UL (ref 2.15–7.56)
NEUTROPHILS NFR BLD: 51.2 % (ref 34–71.1)
NUCLEATED RED BLOOD CELLS: 0 /100 WBC (ref 0–0.2)
PLATELET # BLD AUTO: 247 X 10 3/UL (ref 135–400)
POTASSIUM: 4.7 MMOL/L (ref 3.5–5.1)
PROT SNV-MCNC: 7 G/DL (ref 6.4–8.3)
RBC # BLD AUTO: 5.55 X 10 6/UL (ref 4.7–6.1)
RDW-SD: 40.8 FL (ref 37–54)
SODIUM: 138 MMOL/L (ref 136–145)
T4, FREE: 1.04 NG/DL (ref 0.93–1.7)
TRIGL SERPL-MCNC: 163 MG/DL (ref 0–150)
TSH SERPL DL<=0.005 MIU/L-ACNC: 5.22 UIU/ML (ref 0.27–4.2)
UREA NITROGEN (BUN): 11.6 MG/DL (ref 8–23)
WBC # BLD: 6.28 X 10 3/UL (ref 4.3–10.8)

## 2023-06-08 ENCOUNTER — OFFICE VISIT (OUTPATIENT)
Dept: FAMILY MEDICINE | Facility: CLINIC | Age: 73
End: 2023-06-08
Payer: MEDICARE

## 2023-06-08 VITALS
SYSTOLIC BLOOD PRESSURE: 120 MMHG | WEIGHT: 203.19 LBS | HEART RATE: 94 BPM | OXYGEN SATURATION: 100 % | DIASTOLIC BLOOD PRESSURE: 80 MMHG | HEIGHT: 68 IN | BODY MASS INDEX: 30.8 KG/M2

## 2023-06-08 DIAGNOSIS — H72.92 PERFORATION OF LEFT TYMPANIC MEMBRANE: ICD-10-CM

## 2023-06-08 DIAGNOSIS — E78.5 HYPERLIPIDEMIA, UNSPECIFIED HYPERLIPIDEMIA TYPE: ICD-10-CM

## 2023-06-08 DIAGNOSIS — M54.9 DORSALGIA, UNSPECIFIED: ICD-10-CM

## 2023-06-08 DIAGNOSIS — M54.17 LUMBOSACRAL RADICULOPATHY: Primary | ICD-10-CM

## 2023-06-08 DIAGNOSIS — N52.9 ERECTILE DYSFUNCTION, UNSPECIFIED ERECTILE DYSFUNCTION TYPE: ICD-10-CM

## 2023-06-08 PROCEDURE — 99214 PR OFFICE/OUTPT VISIT, EST, LEVL IV, 30-39 MIN: ICD-10-PCS | Mod: S$GLB,,, | Performed by: FAMILY MEDICINE

## 2023-06-08 PROCEDURE — 99214 OFFICE O/P EST MOD 30 MIN: CPT | Mod: S$GLB,,, | Performed by: FAMILY MEDICINE

## 2023-06-08 RX ORDER — TADALAFIL 10 MG/1
10 TABLET ORAL DAILY PRN
Qty: 30 TABLET | Refills: 3 | Status: SHIPPED | OUTPATIENT
Start: 2023-06-08 | End: 2024-01-10 | Stop reason: SDUPTHER

## 2023-06-08 NOTE — PROGRESS NOTES
Subjective:      Patient ID: Torrey Lira is a 72 y.o. male.    Chief Complaint: Medication Refill      HPI:  72-year-old male who presents for chronic med management.  Has been losing some weight.  Recently saw the ENT.  He was not sure about surgery for his ear but now considering.  Continues have low back pain.  Shoots down left leg.  Had a normal MRI over year ago.  Would like intervention if possible request refill of his Cialis    History reviewed. No pertinent past medical history.  Past Surgical History:   Procedure Laterality Date    LASIK Bilateral 2013    TOTAL KNEE REPLACEMENT USING COMPUTER NAVIGATION Bilateral 08/2019, 12/2019     History reviewed. No pertinent family history.  Social History     Socioeconomic History    Marital status:    Tobacco Use    Smoking status: Former    Smokeless tobacco: Never   Substance and Sexual Activity    Alcohol use: Yes     Comment: only a shot when hes got a cold     Drug use: Never     Review of patient's allergies indicates:  No Known Allergies    Review of Systems   Constitutional:  Negative for activity change, appetite change, chills, fatigue, fever and unexpected weight change.   HENT:  Negative for congestion, ear pain, rhinorrhea, sinus pressure, sinus pain, sneezing, sore throat and trouble swallowing.    Eyes:  Negative for photophobia, pain and itching.   Respiratory:  Negative for cough, chest tightness, shortness of breath and wheezing.    Cardiovascular:  Negative for chest pain, palpitations and leg swelling.   Gastrointestinal:  Negative for abdominal distention, abdominal pain, constipation, diarrhea, nausea and vomiting.   Endocrine: Negative for cold intolerance, heat intolerance, polydipsia and polyphagia.   Genitourinary:  Negative for difficulty urinating, dysuria and frequency.   Musculoskeletal:  Positive for back pain. Negative for arthralgias, joint swelling and myalgias.   Skin:  Negative for pallor and rash.   Neurological:   "Positive for numbness. Negative for dizziness, seizures, syncope, speech difficulty and headaches.   Hematological:  Negative for adenopathy. Does not bruise/bleed easily.   Psychiatric/Behavioral:  Negative for agitation, behavioral problems and hallucinations.      Objective:       /80 (BP Location: Left arm, Patient Position: Sitting, BP Method: Medium (Manual))   Pulse 94   Ht 5' 8" (1.727 m)   Wt 92.2 kg (203 lb 3.2 oz)   SpO2 100%   BMI 30.90 kg/m²   Physical Exam  Vitals and nursing note reviewed.   Constitutional:       Appearance: He is well-developed.   HENT:      Head: Normocephalic and atraumatic.      Nose: Nose normal.   Eyes:      Conjunctiva/sclera: Conjunctivae normal.      Pupils: Pupils are equal, round, and reactive to light.   Cardiovascular:      Rate and Rhythm: Normal rate and regular rhythm.      Heart sounds: Normal heart sounds.   Pulmonary:      Effort: Pulmonary effort is normal.      Breath sounds: Normal breath sounds.   Abdominal:      Palpations: Abdomen is soft.      Tenderness: There is no abdominal tenderness.   Musculoskeletal:         General: Normal range of motion.      Cervical back: Normal range of motion and neck supple.   Skin:     General: Skin is warm and dry.   Neurological:      Mental Status: He is alert and oriented to person, place, and time.   Psychiatric:         Behavior: Behavior normal.         Thought Content: Thought content normal.         Judgment: Judgment normal.       Assessment:     1. Lumbosacral radiculopathy    2. Erectile dysfunction, unspecified erectile dysfunction type    3. Perforation of left tympanic membrane    4. Dorsalgia, unspecified    5. Hyperlipidemia, unspecified hyperlipidemia type        Plan:   Lumbosacral radiculopathy  -     MRI Lumbar Spine Without Contrast; Future; Expected date: 06/08/2023    Erectile dysfunction, unspecified erectile dysfunction type  -     tadalafiL (CIALIS) 10 MG tablet; Take 1 tablet (10 mg " total) by mouth daily as needed for Erectile Dysfunction.  Dispense: 30 tablet; Refill: 3    Perforation of left tympanic membrane    Dorsalgia, unspecified  -     MRI Lumbar Spine Without Contrast; Future; Expected date: 06/08/2023    Hyperlipidemia, unspecified hyperlipidemia type  -     Comprehensive Metabolic Panel; Future; Expected date: 06/08/2023  -     Lipid Panel; Future; Expected date: 06/08/2023      Order MRI.      Refill Cialis.      Labs pending.      Congratulated on weight loss.      Continue follow-up with ENT    Medication List with Changes/Refills   Changed and/or Refilled Medications    Modified Medication Previous Medication    TADALAFIL (CIALIS) 10 MG TABLET tadalafiL (CIALIS) 10 MG tablet       Take 1 tablet (10 mg total) by mouth daily as needed for Erectile Dysfunction.    Take 1 tablet (10 mg total) by mouth daily as needed for Erectile Dysfunction.            Disclaimer: This note may have been prepared using voice recognition software, it may have not been extensively proofed, as such there could be errors within the text such as sound alike errors.

## 2023-06-09 LAB
ALBUMIN SERPL-MCNC: 4.5 G/DL (ref 3.5–5.2)
ALBUMIN/GLOB SERPL ELPH: 1.7 {RATIO} (ref 1–2.7)
ALP ISOS SERPL LEV INH-CCNC: 69 U/L (ref 40–130)
ALT (SGPT): 17 U/L (ref 0–41)
ANION GAP SERPL CALC-SCNC: 13 MMOL/L (ref 8–17)
AST SERPL-CCNC: 20 U/L (ref 0–40)
BILIRUBIN, TOTAL: 0.35 MG/DL (ref 0–1.2)
BUN/CREAT SERPL: 16.8 (ref 6–20)
CALCIUM SERPL-MCNC: 9.9 MG/DL (ref 8.6–10.2)
CARBON DIOXIDE, CO2: 20 MMOL/L (ref 22–29)
CHLORIDE: 108 MMOL/L (ref 98–107)
CHOLEST SERPL-MSCNC: 221 MG/DL (ref 100–200)
CREAT SERPL-MCNC: 0.96 MG/DL (ref 0.7–1.2)
GFR ESTIMATION: 84.5 ML/MIN/1.73M2
GLOBULIN: 2.7 G/DL (ref 1.5–4.5)
GLUCOSE: 86 MG/DL (ref 82–115)
HDLC SERPL-MCNC: 41 MG/DL
LDL/HDL RATIO: 3.6 (ref 1–3)
LDLC SERPL CALC-MCNC: 148.2 MG/DL (ref 0–100)
POTASSIUM: 4.7 MMOL/L (ref 3.5–5.1)
PROT SNV-MCNC: 7.2 G/DL (ref 6.4–8.3)
SODIUM: 141 MMOL/L (ref 136–145)
TRIGL SERPL-MCNC: 159 MG/DL (ref 0–150)
UREA NITROGEN (BUN): 16.1 MG/DL (ref 8–23)

## 2023-07-26 DIAGNOSIS — M54.17 LUMBOSACRAL RADICULOPATHY: Primary | ICD-10-CM

## 2024-01-10 DIAGNOSIS — N52.9 ERECTILE DYSFUNCTION, UNSPECIFIED ERECTILE DYSFUNCTION TYPE: ICD-10-CM

## 2024-01-10 RX ORDER — TADALAFIL 10 MG/1
10 TABLET ORAL DAILY PRN
Qty: 30 TABLET | Refills: 3 | Status: SHIPPED | OUTPATIENT
Start: 2024-01-10 | End: 2025-01-09

## 2024-06-21 ENCOUNTER — OFFICE VISIT (OUTPATIENT)
Dept: FAMILY MEDICINE | Facility: CLINIC | Age: 74
End: 2024-06-21
Payer: MEDICARE

## 2024-06-21 VITALS
HEART RATE: 92 BPM | DIASTOLIC BLOOD PRESSURE: 78 MMHG | HEIGHT: 68 IN | WEIGHT: 205.81 LBS | OXYGEN SATURATION: 97 % | RESPIRATION RATE: 20 BRPM | BODY MASS INDEX: 31.19 KG/M2 | SYSTOLIC BLOOD PRESSURE: 120 MMHG

## 2024-06-21 DIAGNOSIS — H72.92 OTITIS MEDIA, SEROUS, TM RUPTURE, LEFT: ICD-10-CM

## 2024-06-21 DIAGNOSIS — H65.92 OTITIS MEDIA, SEROUS, TM RUPTURE, LEFT: ICD-10-CM

## 2024-06-21 DIAGNOSIS — M54.17 LUMBOSACRAL RADICULOPATHY: Primary | ICD-10-CM

## 2024-06-21 DIAGNOSIS — Z79.899 ON LONG TERM DRUG THERAPY: ICD-10-CM

## 2024-06-21 DIAGNOSIS — Z12.5 SCREENING FOR MALIGNANT NEOPLASM OF PROSTATE: ICD-10-CM

## 2024-06-21 DIAGNOSIS — N52.9 ERECTILE DYSFUNCTION, UNSPECIFIED ERECTILE DYSFUNCTION TYPE: ICD-10-CM

## 2024-06-21 DIAGNOSIS — Z12.11 SCREENING FOR MALIGNANT NEOPLASM OF COLON: ICD-10-CM

## 2024-06-21 LAB
ABS NRBC COUNT: 0 X 10 3/UL (ref 0–0.01)
ABSOLUTE BASOPHIL: 0.02 X 10 3/UL (ref 0–0.22)
ABSOLUTE EOSINOPHIL: 0.12 X 10 3/UL (ref 0.04–0.54)
ABSOLUTE IMMATURE GRAN: 0.03 X 10 3/UL (ref 0–0.04)
ABSOLUTE LYMPHOCYTE: 2.61 X 10 3/UL (ref 0.86–4.75)
ABSOLUTE MONOCYTE: 0.78 X 10 3/UL (ref 0.22–1.08)
ALBUMIN SERPL-MCNC: 4.2 G/DL (ref 3.5–5.2)
ALBUMIN/GLOB SERPL ELPH: 1.6 {RATIO} (ref 1–2.7)
ALP ISOS SERPL LEV INH-CCNC: 60 U/L (ref 40–130)
ALT (SGPT): 22 U/L (ref 0–41)
ANION GAP SERPL CALC-SCNC: 13 MMOL/L (ref 8–17)
AST SERPL-CCNC: 20 U/L (ref 0–40)
BASOPHILS NFR BLD: 0.2 % (ref 0.2–1.2)
BILIRUBIN, TOTAL: 0.45 MG/DL (ref 0–1.2)
BUN/CREAT SERPL: 16.5 (ref 6–20)
CALCIUM SERPL-MCNC: 9.5 MG/DL (ref 8.6–10.2)
CARBON DIOXIDE, CO2: 23 MMOL/L (ref 22–29)
CHLORIDE: 104 MMOL/L (ref 98–107)
CHOLEST SERPL-MSCNC: 201 MG/DL (ref 100–200)
CREAT SERPL-MCNC: 0.91 MG/DL (ref 0.7–1.2)
EOSINOPHIL NFR BLD: 1.4 % (ref 0.7–7)
ESTIMATED AVERAGE GLUCOSE: 110 MG/DL
GFR ESTIMATION: 89.55 ML/MIN/1.73M2
GLOBULIN: 2.7 G/DL (ref 1.5–4.5)
GLUCOSE: 81 MG/DL (ref 82–115)
HBA1C MFR BLD: 5.4 % (ref 4–6)
HCT VFR BLD AUTO: 49.1 % (ref 42–52)
HDLC SERPL-MCNC: 49 MG/DL
HGB BLD-MCNC: 16.1 G/DL (ref 14–18)
IMMATURE GRANULOCYTES: 0.4 % (ref 0–0.5)
LDL/HDL RATIO: 2.6 (ref 1–3)
LDLC SERPL CALC-MCNC: 125.2 MG/DL (ref 0–100)
LYMPHOCYTES NFR BLD: 30.7 % (ref 19.3–53.1)
MCH RBC QN AUTO: 29 PG (ref 27–32)
MCHC RBC AUTO-ENTMCNC: 32.8 G/DL (ref 32–36)
MCV RBC AUTO: 88.5 FL (ref 80–94)
MONOCYTES NFR BLD: 9.2 % (ref 4.7–12.5)
NEUTROPHILS # BLD AUTO: 4.93 X 10 3/UL (ref 2.15–7.56)
NEUTROPHILS NFR BLD: 58.1 % (ref 34–71.1)
NUCLEATED RED BLOOD CELLS: 0 /100 WBC (ref 0–0.2)
PLATELET # BLD AUTO: 233 X 10 3/UL (ref 135–400)
POTASSIUM: 4.7 MMOL/L (ref 3.5–5.1)
PROT SNV-MCNC: 6.9 G/DL (ref 6.4–8.3)
PSA: 0.38 NG/ML (ref 0–4)
RBC # BLD AUTO: 5.55 X 10 6/UL (ref 4.7–6.1)
RDW-SD: 46 FL (ref 37–54)
SODIUM: 140 MMOL/L (ref 136–145)
T4, FREE: 1.13 NG/DL (ref 0.93–1.7)
TRIGL SERPL-MCNC: 134 MG/DL (ref 0–150)
TSH SERPL DL<=0.005 MIU/L-ACNC: 3.72 UIU/ML (ref 0.27–4.2)
UREA NITROGEN (BUN): 15 MG/DL (ref 8–23)
WBC # BLD: 8.49 X 10 3/UL (ref 4.3–10.8)

## 2024-06-21 RX ORDER — TADALAFIL 20 MG/1
20 TABLET ORAL DAILY PRN
Qty: 30 TABLET | Refills: 3 | Status: SHIPPED | OUTPATIENT
Start: 2024-06-21 | End: 2025-06-21

## 2024-06-21 NOTE — PROGRESS NOTES
Subjective:      Patient ID: Torrey Lira is a 73 y.o. male.    Chief Complaint: Annual Exam, Medication Problem (Pt. Reports that cialis he is taking is not working as it's suppose to,and would like to try to increase if possible. He thinks he may have hemorrhoids), and Hemorrhoids      HPI:  73-year-old male who presents for chronic med management.  Reports injection helped with his back pain and neuropathy for about 10 days.  Getting evaluated for another injection next week.  Has not been able to get with the ENT.  Going to be working out of town for another year and can not get an again.  Does have hearing loss in his left ear.  Has noticed some intermittent rectal pain.  Worried he could have a hemorrhoid.  No blood in stool.  Recently got .  Viagra not giving erections.  Would like to try something else.    No past medical history on file.  Past Surgical History:   Procedure Laterality Date    LASIK Bilateral 2013    TOTAL KNEE REPLACEMENT USING COMPUTER NAVIGATION Bilateral 08/2019, 12/2019     No family history on file.  Social History     Socioeconomic History    Marital status:    Tobacco Use    Smoking status: Former    Smokeless tobacco: Never   Substance and Sexual Activity    Alcohol use: Yes     Comment: only a shot when hes got a cold     Drug use: Never     Review of patient's allergies indicates:  No Known Allergies    Review of Systems   Constitutional:  Negative for activity change, appetite change, chills, fatigue, fever and unexpected weight change.   HENT:  Negative for congestion, ear pain, rhinorrhea, sinus pressure, sinus pain, sneezing, sore throat and trouble swallowing.    Eyes:  Negative for photophobia, pain and itching.   Respiratory:  Negative for cough, chest tightness, shortness of breath and wheezing.    Cardiovascular:  Negative for chest pain, palpitations and leg swelling.   Gastrointestinal:  Positive for rectal pain. Negative for abdominal distention,  "abdominal pain, constipation, diarrhea, nausea and vomiting.   Endocrine: Negative for cold intolerance, heat intolerance, polydipsia and polyphagia.   Genitourinary:  Negative for difficulty urinating, dysuria and frequency.   Musculoskeletal:  Positive for back pain. Negative for arthralgias, joint swelling and myalgias.   Skin:  Negative for pallor and rash.   Neurological:  Positive for numbness. Negative for dizziness, seizures, syncope, speech difficulty and headaches.   Hematological:  Negative for adenopathy. Does not bruise/bleed easily.   Psychiatric/Behavioral:  Negative for agitation, behavioral problems and hallucinations.        Objective:       /78   Pulse 92   Resp 20   Ht 5' 8" (1.727 m)   Wt 93.4 kg (205 lb 12.8 oz)   SpO2 97%   BMI 31.29 kg/m²   Physical Exam  Vitals and nursing note reviewed.   Constitutional:       Appearance: He is well-developed.   HENT:      Head: Normocephalic and atraumatic.      Comments: Ruptured TM left     Nose: Nose normal.   Eyes:      Conjunctiva/sclera: Conjunctivae normal.      Pupils: Pupils are equal, round, and reactive to light.   Cardiovascular:      Rate and Rhythm: Normal rate and regular rhythm.      Heart sounds: Normal heart sounds.   Pulmonary:      Effort: Pulmonary effort is normal.      Breath sounds: Normal breath sounds.   Abdominal:      Palpations: Abdomen is soft.      Tenderness: There is no abdominal tenderness.   Genitourinary:     Prostate: Enlarged. Not tender and no nodules present.      Rectum: Normal.   Musculoskeletal:         General: Normal range of motion.      Cervical back: Normal range of motion and neck supple.   Skin:     General: Skin is warm and dry.   Neurological:      Mental Status: He is alert and oriented to person, place, and time.   Psychiatric:         Behavior: Behavior normal.         Thought Content: Thought content normal.         Judgment: Judgment normal.         Assessment:     1. Lumbosacral " radiculopathy    2. Erectile dysfunction, unspecified erectile dysfunction type    3. Screening for malignant neoplasm of prostate    4. On long term drug therapy    5. Screening for malignant neoplasm of colon    6. Otitis media, serous, tm rupture, left        Plan:   Lumbosacral radiculopathy    Erectile dysfunction, unspecified erectile dysfunction type  -     tadalafiL (CIALIS) 20 MG Tab; Take 1 tablet (20 mg total) by mouth daily as needed for Erectile Dysfunction.  Dispense: 30 tablet; Refill: 3    Screening for malignant neoplasm of prostate  -     PSA, Screening; Future; Expected date: 06/21/2024    On long term drug therapy  -     Hemoglobin A1C; Future; Expected date: 06/21/2024  -     CBC Auto Differential; Future; Expected date: 06/21/2024  -     TSH; Future; Expected date: 06/21/2024  -     T4, Free; Future; Expected date: 06/21/2024  -     Comprehensive Metabolic Panel; Future; Expected date: 06/21/2024  -     Lipid Panel; Future; Expected date: 06/21/2024    Screening for malignant neoplasm of colon  -     Cologuard Screening (Multitarget Stool DNA); Future; Expected date: 06/21/2024    Otitis media, serous, tm rupture, left      Labs pending.      Will refer back to ENT when patient can make appointment.      Trial of Cialis.      Continue follow-up with pain management.      Order Cologuard.      Follow-up in 1 year.  Sooner if needed    Medication List with Changes/Refills   Changed and/or Refilled Medications    Modified Medication Previous Medication    TADALAFIL (CIALIS) 20 MG TAB tadalafiL (CIALIS) 10 MG tablet       Take 1 tablet (20 mg total) by mouth daily as needed for Erectile Dysfunction.    Take 1 tablet (10 mg total) by mouth daily as needed for Erectile Dysfunction.            Disclaimer: This note may have been prepared using voice recognition software, it may have not been extensively proofed, as such there could be errors within the text such as sound alike errors.

## 2025-02-13 ENCOUNTER — TELEPHONE (OUTPATIENT)
Dept: FAMILY MEDICINE | Facility: CLINIC | Age: 75
End: 2025-02-13
Payer: MEDICARE

## 2025-02-13 DIAGNOSIS — N52.9 ERECTILE DYSFUNCTION, UNSPECIFIED ERECTILE DYSFUNCTION TYPE: Primary | ICD-10-CM

## 2025-02-13 RX ORDER — SILDENAFIL 100 MG/1
100 TABLET, FILM COATED ORAL DAILY PRN
Qty: 6 TABLET | Refills: 1 | Status: SHIPPED | OUTPATIENT
Start: 2025-02-13 | End: 2026-02-13

## 2025-03-27 PROBLEM — E55.9 VITAMIN D DEFICIENCY: Status: ACTIVE | Noted: 2025-03-27

## 2025-03-27 PROBLEM — E53.8 FOLATE DEFICIENCY: Status: ACTIVE | Noted: 2025-03-27

## 2025-06-02 ENCOUNTER — OFFICE VISIT (OUTPATIENT)
Dept: FAMILY MEDICINE | Facility: CLINIC | Age: 75
End: 2025-06-02
Payer: MEDICARE

## 2025-06-02 VITALS
RESPIRATION RATE: 18 BRPM | BODY MASS INDEX: 32.28 KG/M2 | OXYGEN SATURATION: 98 % | TEMPERATURE: 99 F | HEART RATE: 82 BPM | HEIGHT: 68 IN | SYSTOLIC BLOOD PRESSURE: 128 MMHG | WEIGHT: 213 LBS | DIASTOLIC BLOOD PRESSURE: 78 MMHG

## 2025-06-02 DIAGNOSIS — Z79.899 ON LONG TERM DRUG THERAPY: ICD-10-CM

## 2025-06-02 DIAGNOSIS — E78.5 HYPERLIPIDEMIA, UNSPECIFIED HYPERLIPIDEMIA TYPE: ICD-10-CM

## 2025-06-02 DIAGNOSIS — Z13.6 SCREENING FOR AAA (ABDOMINAL AORTIC ANEURYSM): ICD-10-CM

## 2025-06-02 DIAGNOSIS — N52.9 ERECTILE DYSFUNCTION, UNSPECIFIED ERECTILE DYSFUNCTION TYPE: ICD-10-CM

## 2025-06-02 DIAGNOSIS — Z12.5 SCREENING FOR MALIGNANT NEOPLASM OF PROSTATE: ICD-10-CM

## 2025-06-02 DIAGNOSIS — N40.0 BENIGN PROSTATIC HYPERPLASIA, UNSPECIFIED WHETHER LOWER URINARY TRACT SYMPTOMS PRESENT: Primary | ICD-10-CM

## 2025-06-02 PROCEDURE — 99214 OFFICE O/P EST MOD 30 MIN: CPT | Mod: S$GLB,,, | Performed by: STUDENT IN AN ORGANIZED HEALTH CARE EDUCATION/TRAINING PROGRAM

## 2025-06-02 RX ORDER — TADALAFIL 20 MG/1
20 TABLET ORAL DAILY
Qty: 30 TABLET | Refills: 3 | Status: SHIPPED | OUTPATIENT
Start: 2025-06-02 | End: 2026-06-02

## 2025-06-24 ENCOUNTER — OFFICE VISIT (OUTPATIENT)
Dept: FAMILY MEDICINE | Facility: CLINIC | Age: 75
End: 2025-06-24
Payer: MEDICARE

## 2025-06-24 VITALS
HEART RATE: 74 BPM | WEIGHT: 212 LBS | TEMPERATURE: 99 F | BODY MASS INDEX: 32.13 KG/M2 | SYSTOLIC BLOOD PRESSURE: 128 MMHG | OXYGEN SATURATION: 98 % | RESPIRATION RATE: 18 BRPM | DIASTOLIC BLOOD PRESSURE: 78 MMHG | HEIGHT: 68 IN

## 2025-06-24 DIAGNOSIS — H65.92 OTITIS MEDIA, SEROUS, TM RUPTURE, LEFT: Primary | ICD-10-CM

## 2025-06-24 DIAGNOSIS — H72.92 OTITIS MEDIA, SEROUS, TM RUPTURE, LEFT: Primary | ICD-10-CM

## 2025-06-24 PROCEDURE — 99213 OFFICE O/P EST LOW 20 MIN: CPT | Mod: S$GLB,,, | Performed by: STUDENT IN AN ORGANIZED HEALTH CARE EDUCATION/TRAINING PROGRAM

## 2025-06-24 NOTE — PROGRESS NOTES
"Subjective:      Patient ID: Torrey Lira is a 74 y.o. male.    Chief Complaint: Follow-up (Left ear stopped up)      HPI:  74-year-old male presents today for left ear discomfort.  Patient states he has been dealing with this for several years now.  Was told back in 2022 that he had a hole in his left eardrum.  Not currently causing him much pain.  Just uncomfortable from time to time.  He does have some drainage.  No fever.  No cough or congestion.  Was not able to follow-up with ENT in the past.  Would like to proceed with new referral.  No other acute complaints this time.    History reviewed. No pertinent past medical history.  Past Surgical History:   Procedure Laterality Date    LASIK Bilateral 2013    TOTAL KNEE REPLACEMENT USING COMPUTER NAVIGATION Bilateral 08/2019, 12/2019     No family history on file.  Social History[1]  Review of patient's allergies indicates:  No Known Allergies    Review of Systems   Constitutional:  Negative for activity change, appetite change, fatigue, fever and unexpected weight change.   HENT:  Positive for ear pain. Negative for congestion, postnasal drip, rhinorrhea and sinus pain.    Eyes:  Negative for visual disturbance.   Respiratory:  Negative for cough and shortness of breath.    Cardiovascular:  Negative for chest pain and palpitations.   Gastrointestinal:  Negative for abdominal pain.   Musculoskeletal:  Negative for arthralgias and myalgias.   Neurological:  Negative for dizziness and light-headedness.   Psychiatric/Behavioral:  Negative for behavioral problems and hallucinations. The patient is not hyperactive.        Objective:       /78 (BP Location: Left arm, Patient Position: Sitting)   Pulse 74   Temp 98.6 °F (37 °C) (Oral)   Resp 18   Ht 5' 8" (1.727 m)   Wt 96.2 kg (212 lb)   SpO2 98%   BMI 32.23 kg/m²   Physical Exam  Vitals and nursing note reviewed.   Constitutional:       Appearance: Normal appearance. He is well-developed.   HENT:      Head: " Normocephalic and atraumatic.      Right Ear: Tympanic membrane normal.      Left Ear: Tympanic membrane is perforated.   Eyes:      Extraocular Movements: Extraocular movements intact.      Conjunctiva/sclera: Conjunctivae normal.      Pupils: Pupils are equal, round, and reactive to light.   Cardiovascular:      Rate and Rhythm: Normal rate and regular rhythm.   Pulmonary:      Effort: Pulmonary effort is normal.      Breath sounds: Normal breath sounds.   Abdominal:      General: Bowel sounds are normal.      Palpations: Abdomen is soft.   Musculoskeletal:         General: Normal range of motion.      Cervical back: Normal range of motion and neck supple.   Skin:     General: Skin is warm and dry.   Neurological:      General: No focal deficit present.      Mental Status: He is alert and oriented to person, place, and time.         Assessment:     1. Otitis media, serous, tm rupture, left        Plan:   Otitis media, serous, tm rupture, left  -     Ambulatory referral/consult to ENT; Future; Expected date: 07/01/2025      Chronic problem.    Was not able to follow up with ENT in the past.  ENT referral pending.      RTC p.r.n.  Medication List with Changes/Refills   Current Medications    TADALAFIL (CIALIS) 20 MG TAB    Take 1 tablet (20 mg total) by mouth once daily.              Disclaimer: This note may have been prepared using voice recognition software, it may have not been extensively proofed, as such there could be errors within the text such as sound alike errors.          [1]   Social History  Socioeconomic History    Marital status:    Tobacco Use    Smoking status: Former    Smokeless tobacco: Never   Substance and Sexual Activity    Alcohol use: Yes     Comment: only a shot when hes got a cold     Drug use: Never